# Patient Record
Sex: FEMALE | Race: WHITE
[De-identification: names, ages, dates, MRNs, and addresses within clinical notes are randomized per-mention and may not be internally consistent; named-entity substitution may affect disease eponyms.]

---

## 2018-03-19 ENCOUNTER — HOSPITAL ENCOUNTER (OUTPATIENT)
Dept: HOSPITAL 19 - COL.RAD | Age: 68
End: 2018-03-19
Attending: INTERNAL MEDICINE
Payer: MEDICARE

## 2018-03-19 DIAGNOSIS — I65.03: ICD-10-CM

## 2018-03-19 DIAGNOSIS — I65.21: Primary | ICD-10-CM

## 2018-03-19 DIAGNOSIS — R91.8: ICD-10-CM

## 2018-08-02 ENCOUNTER — HOSPITAL ENCOUNTER (OUTPATIENT)
Dept: HOSPITAL 19 - COL.PUL | Age: 68
End: 2018-08-02
Attending: INTERNAL MEDICINE
Payer: MEDICARE

## 2018-08-02 DIAGNOSIS — J44.9: Primary | ICD-10-CM

## 2018-08-02 DIAGNOSIS — F17.210: ICD-10-CM

## 2018-08-03 ENCOUNTER — HOSPITAL ENCOUNTER (OUTPATIENT)
Dept: HOSPITAL 19 - COL.LAB | Age: 68
End: 2018-08-03
Attending: INTERNAL MEDICINE
Payer: MEDICARE

## 2018-08-03 DIAGNOSIS — I87.2: ICD-10-CM

## 2018-08-03 DIAGNOSIS — I70.213: ICD-10-CM

## 2018-08-03 DIAGNOSIS — L97.911: Primary | ICD-10-CM

## 2018-08-06 ENCOUNTER — HOSPITAL ENCOUNTER (OUTPATIENT)
Dept: HOSPITAL 19 - COL.LAB | Age: 68
End: 2018-08-06
Attending: INTERNAL MEDICINE
Payer: MEDICARE

## 2018-08-06 DIAGNOSIS — L97.911: ICD-10-CM

## 2018-08-06 DIAGNOSIS — I87.2: ICD-10-CM

## 2018-08-06 DIAGNOSIS — I70.213: Primary | ICD-10-CM

## 2018-08-07 ENCOUNTER — HOSPITAL ENCOUNTER (OUTPATIENT)
Dept: HOSPITAL 19 - COL.LAB | Age: 68
End: 2018-08-07
Attending: INTERNAL MEDICINE
Payer: MEDICARE

## 2018-08-07 DIAGNOSIS — L97.911: ICD-10-CM

## 2018-08-07 DIAGNOSIS — I70.213: Primary | ICD-10-CM

## 2018-08-07 DIAGNOSIS — I87.2: ICD-10-CM

## 2019-12-11 ENCOUNTER — HOSPITAL ENCOUNTER (INPATIENT)
Dept: HOSPITAL 19 - IMCU | Age: 69
LOS: 3 days | Discharge: HOME | DRG: 281 | End: 2019-12-14
Attending: INTERNAL MEDICINE | Admitting: INTERNAL MEDICINE
Payer: MEDICARE

## 2019-12-11 VITALS — OXYGEN SATURATION: 94 %

## 2019-12-11 VITALS — OXYGEN SATURATION: 92 %

## 2019-12-11 VITALS — OXYGEN SATURATION: 97 %

## 2019-12-11 VITALS — OXYGEN SATURATION: 73 %

## 2019-12-11 VITALS — OXYGEN SATURATION: 93 %

## 2019-12-11 VITALS — OXYGEN SATURATION: 95 %

## 2019-12-11 VITALS — OXYGEN SATURATION: 96 %

## 2019-12-11 VITALS — OXYGEN SATURATION: 91 %

## 2019-12-11 VITALS — OXYGEN SATURATION: 88 %

## 2019-12-11 VITALS — OXYGEN SATURATION: 90 %

## 2019-12-11 VITALS — OXYGEN SATURATION: 86 %

## 2019-12-11 VITALS — OXYGEN SATURATION: 79 %

## 2019-12-11 VITALS — OXYGEN SATURATION: 87 %

## 2019-12-11 VITALS
TEMPERATURE: 98.1 F | DIASTOLIC BLOOD PRESSURE: 61 MMHG | HEART RATE: 48 BPM | SYSTOLIC BLOOD PRESSURE: 114 MMHG | OXYGEN SATURATION: 95 %

## 2019-12-11 VITALS — OXYGEN SATURATION: 99 %

## 2019-12-11 VITALS — OXYGEN SATURATION: 98 %

## 2019-12-11 VITALS — WEIGHT: 130.95 LBS | HEIGHT: 67.01 IN | BODY MASS INDEX: 20.55 KG/M2

## 2019-12-11 VITALS — OXYGEN SATURATION: 89 %

## 2019-12-11 DIAGNOSIS — I21.A1: Primary | ICD-10-CM

## 2019-12-11 DIAGNOSIS — I25.10: ICD-10-CM

## 2019-12-11 DIAGNOSIS — J44.9: ICD-10-CM

## 2019-12-11 DIAGNOSIS — E86.0: ICD-10-CM

## 2019-12-11 DIAGNOSIS — N17.9: ICD-10-CM

## 2019-12-11 DIAGNOSIS — F17.200: ICD-10-CM

## 2019-12-11 DIAGNOSIS — L97.219: ICD-10-CM

## 2019-12-11 DIAGNOSIS — E87.6: ICD-10-CM

## 2019-12-11 DIAGNOSIS — I73.9: ICD-10-CM

## 2019-12-11 LAB
ANION GAP SERPL CALC-SCNC: 9 MMOL/L (ref 7–16)
BASOPHILS # BLD: 0.1 10*3/UL (ref 0–0.2)
BASOPHILS NFR BLD AUTO: 1 % (ref 0–2)
BUN SERPL-MCNC: 75 MG/DL (ref 7–17)
CALCIUM SERPL-MCNC: 12.6 MG/DL (ref 8.4–10.2)
CHLORIDE SERPL-SCNC: 93 MMOL/L (ref 98–107)
CO2 SERPL-SCNC: 32 MMOL/L (ref 22–30)
CREAT SERPL-SCNC: 2.45 UMOL/L (ref 0.52–1.25)
EOSINOPHIL # BLD: 0.2 10*3/UL (ref 0–0.7)
EOSINOPHIL NFR BLD: 2.4 % (ref 0–4)
ERYTHROCYTE [DISTWIDTH] IN BLOOD BY AUTOMATED COUNT: 12.6 % (ref 11.5–14.5)
GLUCOSE SERPL-MCNC: 85 MG/DL (ref 74–106)
GRANULOCYTES # BLD AUTO: 61.9 % (ref 42.2–75.2)
HCT VFR BLD AUTO: 33.3 % (ref 37–47)
HGB BLD-MCNC: 11.2 G/DL (ref 12.5–16)
LYMPHOCYTES # BLD: 1.5 10*3/UL (ref 1.2–3.4)
LYMPHOCYTES NFR BLD: 21.9 % (ref 20–51)
MCH RBC QN AUTO: 32 PG (ref 27–31)
MCHC RBC AUTO-ENTMCNC: 34 G/DL (ref 33–37)
MCV RBC AUTO: 94 FL (ref 80–100)
MONOCYTES # BLD: 0.9 10*3/UL (ref 0.1–0.6)
MONOCYTES NFR BLD AUTO: 12.4 % (ref 1.7–9.3)
NEUTROPHILS # BLD: 4.3 10*3/UL (ref 1.4–6.5)
PLATELET # BLD AUTO: 222 K/MM3 (ref 130–400)
PMV BLD AUTO: 10.4 FL (ref 7.4–10.4)
POTASSIUM SERPL-SCNC: 2.7 MMOL/L (ref 3.4–5)
RBC # BLD AUTO: 3.53 M/MM3 (ref 4.1–5.3)
SODIUM SERPL-SCNC: 135 MMOL/L (ref 137–145)
TROPONIN I SERPL-MCNC: 0.05 NG/ML (ref 0–0.04)

## 2019-12-11 PROCEDURE — G0378 HOSPITAL OBSERVATION PER HR: HCPCS

## 2019-12-11 PROCEDURE — G0379 DIRECT REFER HOSPITAL OBSERV: HCPCS

## 2019-12-12 VITALS — OXYGEN SATURATION: 94 %

## 2019-12-12 VITALS — OXYGEN SATURATION: 92 %

## 2019-12-12 VITALS — OXYGEN SATURATION: 95 %

## 2019-12-12 VITALS — OXYGEN SATURATION: 90 %

## 2019-12-12 VITALS — OXYGEN SATURATION: 97 %

## 2019-12-12 VITALS — OXYGEN SATURATION: 91 %

## 2019-12-12 VITALS — OXYGEN SATURATION: 98 %

## 2019-12-12 VITALS — OXYGEN SATURATION: 96 %

## 2019-12-12 VITALS — OXYGEN SATURATION: 93 %

## 2019-12-12 VITALS — OXYGEN SATURATION: 89 %

## 2019-12-12 VITALS — OXYGEN SATURATION: 99 %

## 2019-12-12 VITALS — TEMPERATURE: 98 F | SYSTOLIC BLOOD PRESSURE: 100 MMHG | DIASTOLIC BLOOD PRESSURE: 60 MMHG | HEART RATE: 57 BPM

## 2019-12-12 VITALS
HEART RATE: 62 BPM | TEMPERATURE: 97.8 F | OXYGEN SATURATION: 94 % | DIASTOLIC BLOOD PRESSURE: 56 MMHG | SYSTOLIC BLOOD PRESSURE: 96 MMHG

## 2019-12-12 VITALS — OXYGEN SATURATION: 87 %

## 2019-12-12 VITALS
TEMPERATURE: 98.2 F | OXYGEN SATURATION: 94 % | SYSTOLIC BLOOD PRESSURE: 120 MMHG | HEART RATE: 60 BPM | DIASTOLIC BLOOD PRESSURE: 57 MMHG

## 2019-12-12 VITALS — TEMPERATURE: 97.7 F | DIASTOLIC BLOOD PRESSURE: 54 MMHG | HEART RATE: 68 BPM | SYSTOLIC BLOOD PRESSURE: 87 MMHG

## 2019-12-12 VITALS — OXYGEN SATURATION: 100 %

## 2019-12-12 VITALS
HEART RATE: 56 BPM | DIASTOLIC BLOOD PRESSURE: 60 MMHG | SYSTOLIC BLOOD PRESSURE: 107 MMHG | TEMPERATURE: 97.7 F | OXYGEN SATURATION: 95 %

## 2019-12-12 VITALS — OXYGEN SATURATION: 88 %

## 2019-12-12 VITALS — OXYGEN SATURATION: 84 %

## 2019-12-12 VITALS — OXYGEN SATURATION: 79 %

## 2019-12-12 VITALS — OXYGEN SATURATION: 85 %

## 2019-12-12 VITALS — DIASTOLIC BLOOD PRESSURE: 52 MMHG | HEART RATE: 55 BPM | TEMPERATURE: 97.8 F | SYSTOLIC BLOOD PRESSURE: 93 MMHG

## 2019-12-12 VITALS — OXYGEN SATURATION: 68 %

## 2019-12-12 VITALS — OXYGEN SATURATION: 83 %

## 2019-12-12 LAB
ANION GAP SERPL CALC-SCNC: 8 MMOL/L (ref 7–16)
APTT PPP: 30.8 SECONDS (ref 26–37)
BUN SERPL-MCNC: 61 MG/DL (ref 7–17)
CALCIUM SERPL-MCNC: 11.6 MG/DL (ref 8.4–10.2)
CHLORIDE SERPL-SCNC: 96 MMOL/L (ref 98–107)
CO2 SERPL-SCNC: 32 MMOL/L (ref 22–30)
CREAT SERPL-SCNC: 2.05 UMOL/L (ref 0.52–1.25)
ERYTHROCYTE [DISTWIDTH] IN BLOOD BY AUTOMATED COUNT: 12.6 % (ref 11.5–14.5)
GLUCOSE SERPL-MCNC: 98 MG/DL (ref 74–106)
HCT VFR BLD AUTO: 29.8 % (ref 37–47)
HGB BLD-MCNC: 10 G/DL (ref 12.5–16)
INR BLD: 1 (ref 0.8–3)
MCH RBC QN AUTO: 32 PG (ref 27–31)
MCHC RBC AUTO-ENTMCNC: 34 G/DL (ref 33–37)
MCV RBC AUTO: 95 FL (ref 80–100)
PLATELET # BLD AUTO: 202 K/MM3 (ref 130–400)
PMV BLD AUTO: 11 FL (ref 7.4–10.4)
POTASSIUM SERPL-SCNC: 2.9 MMOL/L (ref 3.4–5)
POTASSIUM SERPL-SCNC: 3 MMOL/L (ref 3.4–5)
PROTHROMBIN TIME: 11.5 SECONDS (ref 9.7–12.8)
RBC # BLD AUTO: 3.15 M/MM3 (ref 4.1–5.3)
SODIUM SERPL-SCNC: 136 MMOL/L (ref 137–145)
TROPONIN I SERPL-MCNC: 0.14 NG/ML (ref 0–0.04)

## 2019-12-12 NOTE — NUR
MSW student met with the patient to discuss a discharge plan. The patient
lives in [place] with her , Al. The patient has a cane and a walker,
if needed and the patient reports independence with ADLs. The patient's PCP is
Dr. Coto and patient receives medications from [facility] with
no difficulties. The patient does not have advanced directives in the EMR but
was interested in a DPOA-HC form. Form provided. The patient plans to return
home upon discharge with Al or the patient's son, Derrick providing
transportation.  will continue to follow to ensure a safe
discharge.

## 2019-12-13 VITALS — OXYGEN SATURATION: 95 %

## 2019-12-13 VITALS — OXYGEN SATURATION: 92 %

## 2019-12-13 VITALS
TEMPERATURE: 98.1 F | DIASTOLIC BLOOD PRESSURE: 73 MMHG | OXYGEN SATURATION: 95 % | SYSTOLIC BLOOD PRESSURE: 102 MMHG | HEART RATE: 52 BPM

## 2019-12-13 VITALS — OXYGEN SATURATION: 97 %

## 2019-12-13 VITALS — OXYGEN SATURATION: 96 %

## 2019-12-13 VITALS — OXYGEN SATURATION: 94 %

## 2019-12-13 VITALS — OXYGEN SATURATION: 99 %

## 2019-12-13 VITALS — OXYGEN SATURATION: 90 %

## 2019-12-13 VITALS — OXYGEN SATURATION: 91 %

## 2019-12-13 VITALS — OXYGEN SATURATION: 93 %

## 2019-12-13 VITALS — OXYGEN SATURATION: 98 %

## 2019-12-13 VITALS
TEMPERATURE: 98.1 F | HEART RATE: 57 BPM | SYSTOLIC BLOOD PRESSURE: 116 MMHG | DIASTOLIC BLOOD PRESSURE: 69 MMHG | OXYGEN SATURATION: 92 %

## 2019-12-13 VITALS — SYSTOLIC BLOOD PRESSURE: 140 MMHG | DIASTOLIC BLOOD PRESSURE: 85 MMHG | HEART RATE: 76 BPM

## 2019-12-13 VITALS
DIASTOLIC BLOOD PRESSURE: 75 MMHG | TEMPERATURE: 98 F | OXYGEN SATURATION: 93 % | HEART RATE: 54 BPM | SYSTOLIC BLOOD PRESSURE: 118 MMHG

## 2019-12-13 VITALS
OXYGEN SATURATION: 95 % | HEART RATE: 49 BPM | DIASTOLIC BLOOD PRESSURE: 57 MMHG | TEMPERATURE: 97.8 F | SYSTOLIC BLOOD PRESSURE: 114 MMHG

## 2019-12-13 VITALS — DIASTOLIC BLOOD PRESSURE: 65 MMHG | SYSTOLIC BLOOD PRESSURE: 107 MMHG | OXYGEN SATURATION: 96 % | HEART RATE: 68 BPM

## 2019-12-13 VITALS — SYSTOLIC BLOOD PRESSURE: 124 MMHG | HEART RATE: 73 BPM | OXYGEN SATURATION: 96 % | DIASTOLIC BLOOD PRESSURE: 76 MMHG

## 2019-12-13 VITALS
SYSTOLIC BLOOD PRESSURE: 112 MMHG | HEART RATE: 68 BPM | TEMPERATURE: 98 F | OXYGEN SATURATION: 91 % | DIASTOLIC BLOOD PRESSURE: 64 MMHG

## 2019-12-13 VITALS — HEART RATE: 86 BPM | SYSTOLIC BLOOD PRESSURE: 110 MMHG | DIASTOLIC BLOOD PRESSURE: 59 MMHG | OXYGEN SATURATION: 97 %

## 2019-12-13 VITALS — SYSTOLIC BLOOD PRESSURE: 143 MMHG | HEART RATE: 87 BPM | DIASTOLIC BLOOD PRESSURE: 81 MMHG

## 2019-12-13 VITALS
DIASTOLIC BLOOD PRESSURE: 68 MMHG | SYSTOLIC BLOOD PRESSURE: 126 MMHG | HEART RATE: 65 BPM | OXYGEN SATURATION: 95 % | TEMPERATURE: 98.5 F

## 2019-12-13 VITALS — OXYGEN SATURATION: 96 % | DIASTOLIC BLOOD PRESSURE: 52 MMHG | HEART RATE: 71 BPM | SYSTOLIC BLOOD PRESSURE: 101 MMHG

## 2019-12-13 VITALS
HEART RATE: 52 BPM | DIASTOLIC BLOOD PRESSURE: 59 MMHG | TEMPERATURE: 98.3 F | OXYGEN SATURATION: 92 % | SYSTOLIC BLOOD PRESSURE: 98 MMHG

## 2019-12-13 VITALS — OXYGEN SATURATION: 100 %

## 2019-12-13 VITALS — OXYGEN SATURATION: 94 % | HEART RATE: 73 BPM | DIASTOLIC BLOOD PRESSURE: 93 MMHG | SYSTOLIC BLOOD PRESSURE: 135 MMHG

## 2019-12-13 VITALS — OXYGEN SATURATION: 86 %

## 2019-12-13 VITALS — SYSTOLIC BLOOD PRESSURE: 112 MMHG | DIASTOLIC BLOOD PRESSURE: 67 MMHG | HEART RATE: 75 BPM | OXYGEN SATURATION: 98 %

## 2019-12-13 VITALS — TEMPERATURE: 97.5 F | SYSTOLIC BLOOD PRESSURE: 116 MMHG | HEART RATE: 72 BPM | DIASTOLIC BLOOD PRESSURE: 79 MMHG

## 2019-12-13 VITALS — OXYGEN SATURATION: 95 % | SYSTOLIC BLOOD PRESSURE: 109 MMHG | DIASTOLIC BLOOD PRESSURE: 69 MMHG | HEART RATE: 59 BPM

## 2019-12-13 VITALS — OXYGEN SATURATION: 75 %

## 2019-12-13 VITALS
SYSTOLIC BLOOD PRESSURE: 113 MMHG | OXYGEN SATURATION: 97 % | DIASTOLIC BLOOD PRESSURE: 70 MMHG | HEART RATE: 70 BPM | TEMPERATURE: 98.6 F

## 2019-12-13 VITALS — OXYGEN SATURATION: 87 %

## 2019-12-13 VITALS — OXYGEN SATURATION: 88 %

## 2019-12-13 VITALS — OXYGEN SATURATION: 89 %

## 2019-12-13 LAB
ANION GAP SERPL CALC-SCNC: 6 MMOL/L (ref 7–16)
BUN SERPL-MCNC: 42 MG/DL (ref 7–17)
CALCIUM SERPL-MCNC: 11 MG/DL (ref 8.4–10.2)
CHLORIDE SERPL-SCNC: 103 MMOL/L (ref 98–107)
CO2 SERPL-SCNC: 28 MMOL/L (ref 22–30)
CREAT SERPL-SCNC: 1.43 UMOL/L (ref 0.52–1.25)
ERYTHROCYTE [DISTWIDTH] IN BLOOD BY AUTOMATED COUNT: 12.6 % (ref 11.5–14.5)
GLUCOSE SERPL-MCNC: 105 MG/DL (ref 74–106)
HCT VFR BLD AUTO: 29.7 % (ref 37–47)
HGB BLD-MCNC: 9.9 G/DL (ref 12.5–16)
INR BLD: 1 (ref 0.8–3)
MCH RBC QN AUTO: 31 PG (ref 27–31)
MCHC RBC AUTO-ENTMCNC: 33 G/DL (ref 33–37)
MCV RBC AUTO: 94 FL (ref 80–100)
PLATELET # BLD AUTO: 213 K/MM3 (ref 130–400)
PMV BLD AUTO: 10.2 FL (ref 7.4–10.4)
POTASSIUM SERPL-SCNC: 3.3 MMOL/L (ref 3.4–5)
PROTHROMBIN TIME: 11.8 SECONDS (ref 9.7–12.8)
RBC # BLD AUTO: 3.16 M/MM3 (ref 4.1–5.3)
SODIUM SERPL-SCNC: 138 MMOL/L (ref 137–145)

## 2019-12-13 PROCEDURE — 4A023N7 MEASUREMENT OF CARDIAC SAMPLING AND PRESSURE, LEFT HEART, PERCUTANEOUS APPROACH: ICD-10-PCS | Performed by: INTERNAL MEDICINE

## 2019-12-13 PROCEDURE — B2111ZZ FLUOROSCOPY OF MULTIPLE CORONARY ARTERIES USING LOW OSMOLAR CONTRAST: ICD-10-PCS | Performed by: INTERNAL MEDICINE

## 2019-12-13 NOTE — NUR
PT RETURNED FROM CATH LAB. PT IS DROWSY BUT ORIENTED. PT PLACED BACK ON ICU
MONITORS. PT IS SR ON TELE. PT'S VSS. PT HAS SITE TO RIGHT GROIN,
CLEAN/DRY/INTACT WITH NO HEMATOMA. PT RE-EDUCATED ON BEDREST, NOT MOVING RIGHT
LEG, HEAD DOWN, AND APPLYING PRESSURE WITH COUGHING. PT VERBALIZED
UNDERSTANDING. WILL CONTINUE TO MONITOR.

## 2019-12-13 NOTE — NUR
CALLED  AND DISCUSSED PT'S BP IN THE 90'S AND HR IN THE 50'S.
ORDER TO HOLD METOPROLOL AND RAMIPRIL. PLAN TO PROCEED WITH CARDIAC CATH AND
1300 TODAY. PT UPDATED ON PLAN.

## 2019-12-13 NOTE — NUR
BEDSIDE HAND OFF REPORT GIVEN TO WALE RUGGIERO. HEMOSTASIS NOTED. DRESSING TO R
GROIN C/D/I. PT ALERT AND ORIENTED.

## 2019-12-14 VITALS — OXYGEN SATURATION: 96 %

## 2019-12-14 VITALS — OXYGEN SATURATION: 98 %

## 2019-12-14 VITALS — OXYGEN SATURATION: 97 %

## 2019-12-14 VITALS — OXYGEN SATURATION: 95 %

## 2019-12-14 VITALS — OXYGEN SATURATION: 99 %

## 2019-12-14 VITALS — OXYGEN SATURATION: 91 %

## 2019-12-14 VITALS — OXYGEN SATURATION: 94 %

## 2019-12-14 VITALS — SYSTOLIC BLOOD PRESSURE: 134 MMHG | HEART RATE: 57 BPM | DIASTOLIC BLOOD PRESSURE: 75 MMHG | TEMPERATURE: 98 F

## 2019-12-14 VITALS — OXYGEN SATURATION: 100 %

## 2019-12-14 VITALS — OXYGEN SATURATION: 87 %

## 2019-12-14 VITALS
OXYGEN SATURATION: 96 % | SYSTOLIC BLOOD PRESSURE: 115 MMHG | DIASTOLIC BLOOD PRESSURE: 70 MMHG | HEART RATE: 63 BPM | TEMPERATURE: 98 F

## 2019-12-14 VITALS
TEMPERATURE: 97.3 F | DIASTOLIC BLOOD PRESSURE: 74 MMHG | OXYGEN SATURATION: 95 % | HEART RATE: 63 BPM | SYSTOLIC BLOOD PRESSURE: 113 MMHG

## 2019-12-14 VITALS — OXYGEN SATURATION: 93 %

## 2019-12-14 LAB
ANION GAP SERPL CALC-SCNC: 4 MMOL/L (ref 7–16)
BASOPHILS # BLD: 0.1 10*3/UL (ref 0–0.2)
BASOPHILS NFR BLD AUTO: 1 % (ref 0–2)
BUN SERPL-MCNC: 25 MG/DL (ref 7–17)
CALCIUM SERPL-MCNC: 9.4 MG/DL (ref 8.4–10.2)
CHLORIDE SERPL-SCNC: 109 MMOL/L (ref 98–107)
CO2 SERPL-SCNC: 25 MMOL/L (ref 22–30)
CREAT SERPL-SCNC: 1.23 UMOL/L (ref 0.52–1.25)
EOSINOPHIL # BLD: 0.2 10*3/UL (ref 0–0.7)
EOSINOPHIL NFR BLD: 3.6 % (ref 0–4)
ERYTHROCYTE [DISTWIDTH] IN BLOOD BY AUTOMATED COUNT: 12.9 % (ref 11.5–14.5)
GLUCOSE SERPL-MCNC: 89 MG/DL (ref 74–106)
GRANULOCYTES # BLD AUTO: 60.9 % (ref 42.2–75.2)
HCT VFR BLD AUTO: 28.7 % (ref 37–47)
HGB BLD-MCNC: 9.4 G/DL (ref 12.5–16)
LYMPHOCYTES # BLD: 1.5 10*3/UL (ref 1.2–3.4)
LYMPHOCYTES NFR BLD: 24.2 % (ref 20–51)
MCH RBC QN AUTO: 31 PG (ref 27–31)
MCHC RBC AUTO-ENTMCNC: 33 G/DL (ref 33–37)
MCV RBC AUTO: 96 FL (ref 80–100)
MONOCYTES # BLD: 0.6 10*3/UL (ref 0.1–0.6)
MONOCYTES NFR BLD AUTO: 10.1 % (ref 1.7–9.3)
NEUTROPHILS # BLD: 3.8 10*3/UL (ref 1.4–6.5)
PLATELET # BLD AUTO: 214 K/MM3 (ref 130–400)
PMV BLD AUTO: 10.1 FL (ref 7.4–10.4)
POTASSIUM SERPL-SCNC: 3.9 MMOL/L (ref 3.4–5)
RBC # BLD AUTO: 2.99 M/MM3 (ref 4.1–5.3)
SODIUM SERPL-SCNC: 139 MMOL/L (ref 137–145)

## 2019-12-14 NOTE — NUR
PT SITTING IN CHAIR, C/O CHRONIC PSIN IN RIGHT CALF AT CURRENT ULCER SITE OF
5/10. REPORT THAT NOTHING RELIEVES IT. DR. CELESTE WAS AT BEDSIDE TO ASSESS
PT STATUS AND AGREED TO D/C PT HOME WITH FAMILY. PT VSS AT THIS TIME, WILL
COMPLETE D/C PAPER WORK AND UPDATE PROVIDERS AS NEEDED.

## 2019-12-14 NOTE — NUR
Pt d/c'ed home with family IV's removed, vss, pt denies any new pain. D/C
paperwork explain and signature pages placed on chart. She states her
understanding of meds and to f/u with cardiology clinic on Tuesday, 12/17.

## 2019-12-19 ENCOUNTER — HOSPITAL ENCOUNTER (OUTPATIENT)
Dept: HOSPITAL 19 - MC.RAD | Age: 69
End: 2019-12-19
Attending: OBSTETRICS & GYNECOLOGY
Payer: MEDICARE

## 2019-12-19 DIAGNOSIS — Z12.31: Primary | ICD-10-CM

## 2020-02-05 ENCOUNTER — HOSPITAL ENCOUNTER (INPATIENT)
Dept: HOSPITAL 19 - COL.ER | Age: 70
LOS: 2 days | Discharge: HOME | DRG: 641 | End: 2020-02-07
Attending: INTERNAL MEDICINE | Admitting: INTERNAL MEDICINE
Payer: MEDICARE

## 2020-02-05 VITALS — WEIGHT: 134.04 LBS | HEIGHT: 65.98 IN | BODY MASS INDEX: 21.54 KG/M2

## 2020-02-05 VITALS — HEART RATE: 68 BPM | DIASTOLIC BLOOD PRESSURE: 46 MMHG | SYSTOLIC BLOOD PRESSURE: 86 MMHG | TEMPERATURE: 97.5 F

## 2020-02-05 VITALS — SYSTOLIC BLOOD PRESSURE: 91 MMHG | TEMPERATURE: 97.7 F | DIASTOLIC BLOOD PRESSURE: 50 MMHG | HEART RATE: 81 BPM

## 2020-02-05 DIAGNOSIS — E87.6: ICD-10-CM

## 2020-02-05 DIAGNOSIS — K59.00: ICD-10-CM

## 2020-02-05 DIAGNOSIS — Z66: ICD-10-CM

## 2020-02-05 DIAGNOSIS — N17.9: ICD-10-CM

## 2020-02-05 DIAGNOSIS — E78.00: ICD-10-CM

## 2020-02-05 DIAGNOSIS — F17.210: ICD-10-CM

## 2020-02-05 DIAGNOSIS — I25.10: ICD-10-CM

## 2020-02-05 DIAGNOSIS — E87.1: ICD-10-CM

## 2020-02-05 DIAGNOSIS — I73.9: ICD-10-CM

## 2020-02-05 DIAGNOSIS — M85.80: ICD-10-CM

## 2020-02-05 DIAGNOSIS — E83.52: Primary | ICD-10-CM

## 2020-02-05 DIAGNOSIS — Z88.1: ICD-10-CM

## 2020-02-05 DIAGNOSIS — E83.39: ICD-10-CM

## 2020-02-05 DIAGNOSIS — R60.0: ICD-10-CM

## 2020-02-05 DIAGNOSIS — I25.2: ICD-10-CM

## 2020-02-05 LAB
ALBUMIN SERPL-MCNC: 4.4 GM/DL (ref 3.5–5)
ALP SERPL-CCNC: 59 U/L (ref 50–136)
ALT SERPL-CCNC: 13 U/L (ref 9–52)
ANION GAP SERPL CALC-SCNC: 13 MMOL/L (ref 7–16)
AST SERPL-CCNC: 42 U/L (ref 15–37)
BASOPHILS # BLD: 0.2 10*3/UL (ref 0–0.2)
BASOPHILS NFR BLD AUTO: 2.1 % (ref 0–2)
BILIRUB SERPL-MCNC: 0.6 MG/DL (ref 0–1)
BUN SERPL-MCNC: 61 MG/DL (ref 7–17)
CALCIUM SERPL-MCNC: 14.7 MG/DL (ref 8.4–10.2)
CHLORIDE SERPL-SCNC: 87 MMOL/L (ref 98–107)
CO2 SERPL-SCNC: 33 MMOL/L (ref 22–30)
CREAT SERPL-SCNC: 2.49 UMOL/L (ref 0.52–1.25)
CRP SERPL-MCNC: < 0.5 MG/DL (ref 0–0.9)
EOSINOPHIL # BLD: 0.3 10*3/UL (ref 0–0.7)
EOSINOPHIL NFR BLD: 3.6 % (ref 0–4)
ERYTHROCYTE [DISTWIDTH] IN BLOOD BY AUTOMATED COUNT: 13.6 % (ref 11.5–14.5)
GLUCOSE SERPL-MCNC: 114 MG/DL (ref 74–106)
GRANULOCYTES # BLD AUTO: 60.6 % (ref 42.2–75.2)
HCT VFR BLD AUTO: 31.2 % (ref 37–47)
HGB BLD-MCNC: 9.9 G/DL (ref 12.5–16)
LYMPHOCYTES # BLD: 1.5 10*3/UL (ref 1.2–3.4)
LYMPHOCYTES NFR BLD: 18.6 % (ref 20–51)
MCH RBC QN AUTO: 31 PG (ref 27–31)
MCHC RBC AUTO-ENTMCNC: 32 G/DL (ref 33–37)
MCV RBC AUTO: 98 FL (ref 80–100)
MONOCYTES # BLD: 1.2 10*3/UL (ref 0.1–0.6)
MONOCYTES NFR BLD AUTO: 14.7 % (ref 1.7–9.3)
NEUTROPHILS # BLD: 4.9 10*3/UL (ref 1.4–6.5)
PH UR STRIP.AUTO: 7 [PH] (ref 5–8)
PHOSPHATE SERPL-MCNC: 6 MG/DL (ref 2.5–4.5)
PLATELET # BLD AUTO: 266 K/MM3 (ref 130–400)
PMV BLD AUTO: 10 FL (ref 7.4–10.4)
POTASSIUM SERPL-SCNC: 2.9 MMOL/L (ref 3.4–5)
PROT SERPL-MCNC: 7.6 GM/DL (ref 6.4–8.2)
RBC # BLD AUTO: 3.17 M/MM3 (ref 4.1–5.3)
SODIUM SERPL-SCNC: 133 MMOL/L (ref 137–145)
SP GR UR STRIP.AUTO: 1.01 (ref 1–1.03)
SQUAMOUS # URNS: (no result) /HPF
WBC # UR: (no result) /HPF

## 2020-02-05 NOTE — NUR
Pamidronate is not yet available in the medicine area so this nurse informed
her charge nurse and tried calling pharmacy but no one is answering. This
nurse called house supervisor Anuj, if he can get the Pamidronate from
Pharmacy.

## 2020-02-05 NOTE — NUR
Received report from Jennifer. Patient is awake, sitting in the recliner with
relatives in the bedside. Patient has ongoing IVF NS at 200ml/hr on her left
AC. Call light within reach.

## 2020-02-05 NOTE — NUR
Charge nurse Latonia informed this nurse that Anuj states that the Pharmacist
told him that while they are mixing the medicine they found out that it was
already  and so no medicine available tonight. They said that they
informed Dr. Morin about it.

## 2020-02-05 NOTE — NUR
Patient states that she takes Tramadol for pain for her right leg every night.
She can't recall the dosage. Informed Gem about the pain of the patient
and updated med recon.

## 2020-02-05 NOTE — NUR
Imelda from Lab called this nurse and states that some of the lab tests needed
for the patient requires fasting and they will be able to do the extraction
sailaja morning. Informed patient that she's on NPO by midnight just for the lab
tests.

## 2020-02-05 NOTE — NUR
Pt arrives to medical unit rm 351 from ED, awake and alert. IVF's infusing per
orders through left AC site without s/s of complications. Pt denies current
needs. Call light in reach.

## 2020-02-06 VITALS — SYSTOLIC BLOOD PRESSURE: 109 MMHG | DIASTOLIC BLOOD PRESSURE: 50 MMHG | TEMPERATURE: 98.4 F | HEART RATE: 77 BPM

## 2020-02-06 VITALS — DIASTOLIC BLOOD PRESSURE: 50 MMHG | TEMPERATURE: 98.3 F | SYSTOLIC BLOOD PRESSURE: 116 MMHG | HEART RATE: 84 BPM

## 2020-02-06 VITALS — HEART RATE: 71 BPM | TEMPERATURE: 97.8 F | SYSTOLIC BLOOD PRESSURE: 101 MMHG | DIASTOLIC BLOOD PRESSURE: 45 MMHG

## 2020-02-06 VITALS — TEMPERATURE: 97.7 F | DIASTOLIC BLOOD PRESSURE: 59 MMHG | SYSTOLIC BLOOD PRESSURE: 113 MMHG | HEART RATE: 72 BPM

## 2020-02-06 VITALS — HEART RATE: 59 BPM | TEMPERATURE: 97.6 F | DIASTOLIC BLOOD PRESSURE: 56 MMHG | SYSTOLIC BLOOD PRESSURE: 105 MMHG

## 2020-02-06 VITALS — DIASTOLIC BLOOD PRESSURE: 52 MMHG | SYSTOLIC BLOOD PRESSURE: 119 MMHG | TEMPERATURE: 97.7 F | HEART RATE: 78 BPM

## 2020-02-06 VITALS — SYSTOLIC BLOOD PRESSURE: 98 MMHG | TEMPERATURE: 98 F | HEART RATE: 68 BPM | DIASTOLIC BLOOD PRESSURE: 45 MMHG

## 2020-02-06 LAB
ANION GAP SERPL CALC-SCNC: 7 MMOL/L (ref 7–16)
BASOPHILS # BLD: 0.1 10*3/UL (ref 0–0.2)
BASOPHILS NFR BLD AUTO: 2.4 % (ref 0–2)
BUN SERPL-MCNC: 57 MG/DL (ref 7–17)
CALCIUM SERPL-MCNC: 12.5 MG/DL (ref 8.4–10.2)
CHLORIDE SERPL-SCNC: 98 MMOL/L (ref 98–107)
CO2 SERPL-SCNC: 31 MMOL/L (ref 22–30)
CREAT SERPL-SCNC: 2.11 UMOL/L (ref 0.52–1.25)
EOSINOPHIL # BLD: 0.3 10*3/UL (ref 0–0.7)
EOSINOPHIL NFR BLD: 6.8 % (ref 0–4)
ERYTHROCYTE [DISTWIDTH] IN BLOOD BY AUTOMATED COUNT: 14 % (ref 11.5–14.5)
GLUCOSE SERPL-MCNC: 101 MG/DL (ref 74–106)
GRANULOCYTES # BLD AUTO: 51.7 % (ref 42.2–75.2)
HCT VFR BLD AUTO: 25.9 % (ref 37–47)
HGB BLD-MCNC: 8.2 G/DL (ref 12.5–16)
LYMPHOCYTES # BLD: 1 10*3/UL (ref 1.2–3.4)
LYMPHOCYTES NFR BLD: 24 % (ref 20–51)
MCH RBC QN AUTO: 32 PG (ref 27–31)
MCHC RBC AUTO-ENTMCNC: 32 G/DL (ref 33–37)
MCV RBC AUTO: 100 FL (ref 80–100)
MONOCYTES # BLD: 0.6 10*3/UL (ref 0.1–0.6)
MONOCYTES NFR BLD AUTO: 14.9 % (ref 1.7–9.3)
NEUTROPHILS # BLD: 2.1 10*3/UL (ref 1.4–6.5)
PLATELET # BLD AUTO: 232 K/MM3 (ref 130–400)
PMV BLD AUTO: 10.4 FL (ref 7.4–10.4)
POTASSIUM SERPL-SCNC: 3.6 MMOL/L (ref 3.4–5)
PTH,INTACT: 17.6 PG/ML (ref 6.6–88.9)
RBC # BLD AUTO: 2.6 M/MM3 (ref 4.1–5.3)
SODIUM SERPL-SCNC: 136 MMOL/L (ref 137–145)
URN COLLECT METHOD CLASS: (no result)

## 2020-02-06 NOTE — NUR
Report received at bedside from dayshift nurse. PT states that she would like
to have her RLE dressing changed and dressing is noted to have moderate amount
of drainage. PT denies pain or discomfort at this time but is concerned about
pain with dressing changes and is emphatic that this writer "be careful" when
changing it. This writer assures PT that utmost care will be taken to reduce
risk of pain and educates PT that dressing change will take place after shift
report, initial rounds, and any emergent needs are met. PT states
understanding and replies that "whenever" the dressing change can be taken
care of is fine with her. Call light within reach with PT sitting in recliner.

## 2020-02-06 NOTE — NUR
Patient preferred to sleep in the recliner. Instructed patient to maintain NPO
for her lab works in am. Patient has intermittent pain on right leg. Tylenol
PRN given last night. Will endorse patient to day shift nurse.

## 2020-02-06 NOTE — NUR
met with the patient to discuss discharge planning. Patient
lives in [place] with her , Peña (ph#[phone]) and sees Dr. Coto for
primary care. Patient obtains medications from [facility] [address] [address] [facility] [address] with no
difficulties. Patient uses a walker and no other DME. Patient reports
independence with ADLS. Patient thinks she may have [facility] Direct[facility] but isn't sure. Patient reports she drove herself to the
hospital so she plans to drive herself home. SW to continue to follow as
needed.

## 2020-02-06 NOTE — NUR
PT dressing to RLE changed using NS for wound cleanser, pat dry with guaze,
apply telfa pad and secure with paper tape. PT tolerates dressing change well
and denies any pain with dressing change. PT remains in stable condition with
no s/s of distress noted. Will continue to monitor.

## 2020-02-06 NOTE — NUR
Assessment complete. Patient sitting in recliner. Awake, alert and oriented.
BLE were ededamous and slightly pale but pedal pulses were palpable. Wound on
her right lower shin, dressed in ER, pt requested that I do not unwrap it.
States she gets some stabbing pain in at times but only takes medication for
it at night, not during the day. IV site CD&I, IV fluids infusing @125 ml/hr.
Potassium protocol still in place. No further needs were expressed at this
time. Call light is within reach.

## 2020-02-06 NOTE — NUR
Patient had an uneventful day. Calcium level decreased to 12.5 which is a big
improvement. She is hopeful that she can go home tomorrow. IV fluids still
infusing @125. She has been up to restroom a couple times. No complaints of
pain all day. Family was in with her earlier and inquired about her lab work,
I explained that her calcium did come down and that we replaced her potassium.
No further needs were expressed at this time. Call light is within reach.

## 2020-02-06 NOTE — NUR
Initial visit; Patient thanked  for looking in on her and offering
God''s blessings.  will follow up while patient is at [place]
[place].

## 2020-02-07 VITALS — SYSTOLIC BLOOD PRESSURE: 117 MMHG | TEMPERATURE: 97.8 F | HEART RATE: 65 BPM | DIASTOLIC BLOOD PRESSURE: 54 MMHG

## 2020-02-07 VITALS — HEART RATE: 69 BPM | DIASTOLIC BLOOD PRESSURE: 59 MMHG | SYSTOLIC BLOOD PRESSURE: 127 MMHG | TEMPERATURE: 97.6 F

## 2020-02-07 LAB
ANION GAP SERPL CALC-SCNC: 7 MMOL/L (ref 7–16)
BASOPHILS # BLD: 0.1 10*3/UL (ref 0–0.2)
BASOPHILS NFR BLD AUTO: 1.8 % (ref 0–2)
BUN SERPL-MCNC: 40 MG/DL (ref 7–17)
CALCIUM SERPL-MCNC: 11.1 MG/DL (ref 8.4–10.2)
CHLORIDE SERPL-SCNC: 106 MMOL/L (ref 98–107)
CO2 SERPL-SCNC: 27 MMOL/L (ref 22–30)
CREAT SERPL-SCNC: 1.48 UMOL/L (ref 0.52–1.25)
EOSINOPHIL # BLD: 0.3 10*3/UL (ref 0–0.7)
EOSINOPHIL NFR BLD: 7 % (ref 0–4)
ERYTHROCYTE [DISTWIDTH] IN BLOOD BY AUTOMATED COUNT: 14.1 % (ref 11.5–14.5)
GLUCOSE SERPL-MCNC: 95 MG/DL (ref 74–106)
GRANULOCYTES # BLD AUTO: 48.3 % (ref 42.2–75.2)
HCT VFR BLD AUTO: 26.1 % (ref 37–47)
HGB BLD-MCNC: 8 G/DL (ref 12.5–16)
KAPPA LC FREE SER-MCNC: 69.97 MG/L
KAPPA LC/LAMBDA SER: 2.26 RATIO
LAMBDA LC FREE SERPL-MCNC: 30.9 MG/L
LYMPHOCYTES # BLD: 1.2 10*3/UL (ref 1.2–3.4)
LYMPHOCYTES NFR BLD: 26.8 % (ref 20–51)
MCH RBC QN AUTO: 31 PG (ref 27–31)
MCHC RBC AUTO-ENTMCNC: 31 G/DL (ref 33–37)
MCV RBC AUTO: 101 FL (ref 80–100)
MONOCYTES # BLD: 0.7 10*3/UL (ref 0.1–0.6)
MONOCYTES NFR BLD AUTO: 15.9 % (ref 1.7–9.3)
NEUTROPHILS # BLD: 2.1 10*3/UL (ref 1.4–6.5)
PLATELET # BLD AUTO: 196 K/MM3 (ref 130–400)
PMV BLD AUTO: 10.1 FL (ref 7.4–10.4)
POTASSIUM SERPL-SCNC: 3.3 MMOL/L (ref 3.4–5)
RBC # BLD AUTO: 2.58 M/MM3 (ref 4.1–5.3)
SODIUM SERPL-SCNC: 140 MMOL/L (ref 137–145)

## 2020-02-07 NOTE — NUR
PT is resting peacefully in her recliner with eyes closed. Call light within
reach and beverages on bedside tray within reach. Will continue to monitor.

## 2020-02-07 NOTE — NUR
DISCHARGE INFORMATION PROVIDED TO PT. IV AND TELE REMOVED WITHOUT ISSUES. ALL
QUESTIONS ANSWERED. DISCUSSED MED CHANGES AND UPCOMING APPOINTMENTS. PT SON
PRESENT DURING DISCHARGE EDUCATION.  ADMINISTERED ALL OF POTASSIUM TABLES PER
PROTOCOL PRIOR TO DISCHARGE. THIS NURSE ESCORTED PT LILA TO HER VEHICLE.

## 2020-02-10 LAB
ALBUMIN/GLOB SERPL ELPH: 1.04 {RATIO}
B-GLOBULIN SERPL ELPH-MCNC: 0.7 G/DL (ref 0.7–1.2)

## 2020-02-12 ENCOUNTER — HOSPITAL ENCOUNTER (OUTPATIENT)
Dept: HOSPITAL 19 - COL.RAD | Age: 70
End: 2020-02-12
Attending: FAMILY MEDICINE
Payer: MEDICARE

## 2020-02-12 DIAGNOSIS — E83.52: Primary | ICD-10-CM

## 2020-03-23 ENCOUNTER — HOSPITAL ENCOUNTER (OUTPATIENT)
Dept: HOSPITAL 19 - EUO | Age: 70
Discharge: HOME | End: 2020-03-23
Payer: MEDICARE

## 2020-03-23 ENCOUNTER — HOSPITAL ENCOUNTER (OUTPATIENT)
Dept: HOSPITAL 19 - EUO | Age: 70
Discharge: HOME | End: 2020-03-23
Attending: PATHOLOGY
Payer: MEDICARE

## 2020-03-23 VITALS — HEART RATE: 80 BPM | SYSTOLIC BLOOD PRESSURE: 95 MMHG | DIASTOLIC BLOOD PRESSURE: 58 MMHG

## 2020-03-23 VITALS — TEMPERATURE: 97.7 F | HEART RATE: 77 BPM | SYSTOLIC BLOOD PRESSURE: 109 MMHG | DIASTOLIC BLOOD PRESSURE: 54 MMHG

## 2020-03-23 VITALS — HEART RATE: 77 BPM | DIASTOLIC BLOOD PRESSURE: 51 MMHG | SYSTOLIC BLOOD PRESSURE: 98 MMHG

## 2020-03-23 VITALS — HEART RATE: 74 BPM | TEMPERATURE: 97.9 F | DIASTOLIC BLOOD PRESSURE: 59 MMHG | SYSTOLIC BLOOD PRESSURE: 100 MMHG

## 2020-03-23 VITALS — SYSTOLIC BLOOD PRESSURE: 90 MMHG | DIASTOLIC BLOOD PRESSURE: 62 MMHG | HEART RATE: 70 BPM

## 2020-03-23 VITALS — DIASTOLIC BLOOD PRESSURE: 62 MMHG | HEART RATE: 76 BPM | SYSTOLIC BLOOD PRESSURE: 106 MMHG

## 2020-03-23 VITALS — TEMPERATURE: 97.6 F | DIASTOLIC BLOOD PRESSURE: 69 MMHG | SYSTOLIC BLOOD PRESSURE: 127 MMHG | HEART RATE: 112 BPM

## 2020-03-23 VITALS — DIASTOLIC BLOOD PRESSURE: 58 MMHG | HEART RATE: 73 BPM | SYSTOLIC BLOOD PRESSURE: 94 MMHG

## 2020-03-23 VITALS — DIASTOLIC BLOOD PRESSURE: 39 MMHG | HEART RATE: 71 BPM | TEMPERATURE: 97.7 F | SYSTOLIC BLOOD PRESSURE: 96 MMHG

## 2020-03-23 VITALS — HEART RATE: 70 BPM | DIASTOLIC BLOOD PRESSURE: 50 MMHG | SYSTOLIC BLOOD PRESSURE: 113 MMHG | TEMPERATURE: 97.8 F

## 2020-03-23 VITALS — SYSTOLIC BLOOD PRESSURE: 91 MMHG | DIASTOLIC BLOOD PRESSURE: 44 MMHG | HEART RATE: 79 BPM

## 2020-03-23 VITALS — HEIGHT: 66.03 IN | BODY MASS INDEX: 22.07 KG/M2 | WEIGHT: 137.35 LBS

## 2020-03-23 DIAGNOSIS — D64.9: ICD-10-CM

## 2020-03-23 DIAGNOSIS — E83.52: ICD-10-CM

## 2020-03-23 DIAGNOSIS — D64.9: Primary | ICD-10-CM

## 2020-03-23 DIAGNOSIS — E83.52: Primary | ICD-10-CM

## 2020-03-23 LAB
BASOPHILS # BLD: 0.1 10*3/UL (ref 0–0.2)
BASOPHILS NFR BLD AUTO: 1 % (ref 0–2)
EOSINOPHIL # BLD: 0 10*3/UL (ref 0–0.7)
EOSINOPHIL NFR BLD: 0.7 % (ref 0–4)
ERYTHROCYTE [DISTWIDTH] IN BLOOD BY AUTOMATED COUNT: 14.6 % (ref 11.5–14.5)
GRANULOCYTES # BLD AUTO: 68.8 % (ref 42.2–75.2)
HCT VFR BLD AUTO: 25.1 % (ref 37–47)
HGB BLD-MCNC: 7.9 G/DL (ref 12.5–16)
LYMPHOCYTES # BLD: 0.9 10*3/UL (ref 1.2–3.4)
LYMPHOCYTES NFR BLD: 15.9 % (ref 20–51)
MCH RBC QN AUTO: 30 PG (ref 27–31)
MCHC RBC AUTO-ENTMCNC: 32 G/DL (ref 33–37)
MCV RBC AUTO: 95 FL (ref 80–100)
MONOCYTES # BLD: 0.8 10*3/UL (ref 0.1–0.6)
MONOCYTES NFR BLD AUTO: 13.3 % (ref 1.7–9.3)
NEUTROPHILS # BLD: 4 10*3/UL (ref 1.4–6.5)
PLATELET # BLD AUTO: 187 K/MM3 (ref 130–400)
PMV BLD AUTO: 10 FL (ref 7.4–10.4)
RBC # BLD AUTO: 2.65 M/MM3 (ref 4.1–5.3)

## 2020-03-23 PROCEDURE — P9016 RBC LEUKOCYTES REDUCED: HCPCS

## 2020-03-23 NOTE — NUR
TO RM 8 PER CART FROM PACU FOLLOWING A BONE MARROW BX. PER REPORT A-FIB DURING
PROCEDURE. DR ROSE AND DR HILTON CALLED PER CHANTAL ECHEVARRIA IN PACU.
ALERT ORIENTED X3, TALKING TO STAFF.
DENIES PAIN OR DISCOMFORT AT THIS TIME.

## 2020-03-23 NOTE — NUR
AMBULATED TO BATHROOM. VOIDED AND TOLERATED WELL.
DRESSING OVER BONE MARROW BX SITE CLEAN DRY INTACT.

## 2020-12-22 ENCOUNTER — HOSPITAL ENCOUNTER (OUTPATIENT)
Dept: HOSPITAL 19 - MC.RAD | Age: 70
End: 2020-12-22
Attending: OBSTETRICS & GYNECOLOGY
Payer: MEDICARE

## 2020-12-22 DIAGNOSIS — Z12.31: Primary | ICD-10-CM

## 2021-05-11 ENCOUNTER — HOSPITAL ENCOUNTER (OUTPATIENT)
Dept: HOSPITAL 19 - COL.RAD | Age: 71
End: 2021-05-11
Attending: FAMILY MEDICINE
Payer: MEDICARE

## 2021-05-11 DIAGNOSIS — R91.8: ICD-10-CM

## 2021-05-11 DIAGNOSIS — Z12.2: Primary | ICD-10-CM

## 2021-05-11 DIAGNOSIS — F17.210: ICD-10-CM

## 2022-01-27 ENCOUNTER — HOSPITAL ENCOUNTER (OUTPATIENT)
Dept: HOSPITAL 19 - MC.RAD | Age: 72
End: 2022-01-27
Payer: MEDICARE

## 2022-01-27 DIAGNOSIS — Z12.31: Primary | ICD-10-CM

## 2022-05-17 ENCOUNTER — HOSPITAL ENCOUNTER (OUTPATIENT)
Dept: HOSPITAL 19 - COL.RAD | Age: 72
End: 2022-05-17
Attending: FAMILY MEDICINE
Payer: MEDICARE

## 2022-05-17 DIAGNOSIS — Z12.2: Primary | ICD-10-CM

## 2022-05-17 DIAGNOSIS — R91.8: ICD-10-CM

## 2022-05-17 DIAGNOSIS — F17.210: ICD-10-CM
